# Patient Record
Sex: FEMALE | HISPANIC OR LATINO | Employment: FULL TIME | URBAN - METROPOLITAN AREA
[De-identification: names, ages, dates, MRNs, and addresses within clinical notes are randomized per-mention and may not be internally consistent; named-entity substitution may affect disease eponyms.]

---

## 2017-03-08 ENCOUNTER — HOSPITAL ENCOUNTER (EMERGENCY)
Facility: MEDICAL CENTER | Age: 37
End: 2017-03-08
Attending: EMERGENCY MEDICINE
Payer: MEDICAID

## 2017-03-08 ENCOUNTER — APPOINTMENT (OUTPATIENT)
Dept: RADIOLOGY | Facility: MEDICAL CENTER | Age: 37
End: 2017-03-08
Attending: EMERGENCY MEDICINE
Payer: MEDICAID

## 2017-03-08 VITALS
BODY MASS INDEX: 24.09 KG/M2 | WEIGHT: 141.09 LBS | SYSTOLIC BLOOD PRESSURE: 99 MMHG | OXYGEN SATURATION: 98 % | RESPIRATION RATE: 16 BRPM | TEMPERATURE: 98.1 F | DIASTOLIC BLOOD PRESSURE: 62 MMHG | HEIGHT: 64 IN | HEART RATE: 82 BPM

## 2017-03-08 DIAGNOSIS — S90.122A CONTUSION OF LESSER TOE OF LEFT FOOT WITHOUT DAMAGE TO NAIL, INITIAL ENCOUNTER: ICD-10-CM

## 2017-03-08 PROCEDURE — 73660 X-RAY EXAM OF TOE(S): CPT | Mod: LT

## 2017-03-08 PROCEDURE — 99283 EMERGENCY DEPT VISIT LOW MDM: CPT

## 2017-03-08 NOTE — ED AVS SNAPSHOT
3/8/2017          Sara Liz  [address] [place] [id number]    Dear Sara:    [facility] wants to ensure your discharge home is safe and you or your loved ones have had all your questions answered regarding your care after you leave the hospital.    You may receive a telephone call within two days of your discharge.  This call is to make certain you understand your discharge instructions as well as ensure we provided you with the best care possible during your stay with us.     The call will only last approximately 3-5 minutes and will be done by a nurse.    Once again, we want to ensure your discharge home is safe and that you have a clear understanding of any next steps in your care.  If you have any questions or concerns, please do not hesitate to contact us, we are here for you.  Thank you for choosing [facility] for your healthcare needs.    Sincerely,    Kel Stiles    [facility]

## 2017-03-08 NOTE — ED AVS SNAPSHOT
GuidesMob Access Code: [email or web address]  Expires: 4/7/2017  3:38 PM    Your email address is not on file at [facility].  Email Addresses are required for you to sign up for GuidesMob, please contact [phone] to verify your personal information and to provide your email address prior to attempting to register for GuidesMob.    Sara Liz  [address] [place] [address], [place] [address]    GuidesMob  A secure, online tool to manage your health information     [facility]’s GuidesMob® is a secure, online tool that connects you to your personalized health information from the privacy of your home -- day or night - making it very easy for you to manage your healthcare. Once the activation process is completed, you can even access your medical information using the GuidesMob jeovanny, which is available for free in the Apple Jeovanny store or Google Play store.     To learn more about GuidesMob, visit [email or web address]    There are two levels of access available (as shown below):   My Chart Features  [facility] Primary Care Doctor [facility]  Specialists [facility]  Urgent  Care Non-[facility] Primary Care Doctor   Email your healthcare team securely and privately 24/7 X X X    Manage appointments: schedule your next appointment; view details of past/upcoming appointments X      Request prescription refills. X      View recent personal medical records, including lab and immunizations X X X X   View health record, including health history, allergies, medications X X X X   Read reports about your outpatient visits, procedures, consult and ER notes X X X X   See your discharge summary, which is a recap of your hospital and/or ER visit that includes your diagnosis, lab results, and care plan X X  X     How to register for clypdt:  Once your e-mail address has been verified, follow the following steps to sign up for GuidesMob.     1. Go to  [email or web address]  2. Click on the Sign Up Now box, which takes you to the New Member Sign Up page.  You will need to provide the following information:  a. Enter your Ideal Network Access Code exactly as it appears at the top of this page. (You will not need to use this code after you’ve completed the sign-up process. If you do not sign up before the expiration date, you must request a new code.)   b. Enter your date of birth.   c. Enter your home email address.   d. Click Submit, and follow the next screen’s instructions.  3. Create a Neurologixt ID. This will be your Ideal Network login ID and cannot be changed, so think of one that is secure and easy to remember.  4. Create a Ideal Network password. You can change your password at any time.  5. Enter your Password Reset Question and Answer. This can be used at a later time if you forget your password.   6. Enter your e-mail address. This allows you to receive e-mail notifications when new information is available in Ideal Network.  7. Click Sign Up. You can now view your health information.    For assistance activating your Ideal Network account, call [phone]

## 2017-03-08 NOTE — ED AVS SNAPSHOT
Home Care Instructions                                                                                                                Sara Liz   MRN: [id number]    Department:  [facility], Emergency Dept   Date of Visit:  3/8/2017            [facility], Emergency Dept    [address] [place] [id number]    Phone:  [phone]      You were seen by     Reuben Carmona M.D.      Your Diagnosis Was     Contusion of lesser toe of left foot without damage to nail, initial encounter     S90.122A       Follow-up Information     1. Follow up with [facility], Emergency Dept.    Specialty:  Emergency Medicine    Why:  If symptoms worsen    Contact information    [address] [place] [id number] [phone]      Medication Information     Review all of your home medications and newly ordered medications with your primary doctor and/or pharmacist as soon as possible. Follow medication instructions as directed by your doctor and/or pharmacist.     Please keep your complete medication list with you and share with your physician. Update the information when medications are discontinued, doses are changed, or new medications (including over-the-counter products) are added; and carry medication information at all times in the event of emergency situations.               Medication List      ASK your doctor about these medications        Instructions    Morning Afternoon Evening Bedtime    Polyethylene Powd        by Does not apply route every day.                                Procedures and tests performed during your visit     DX-TOE(S) 2+ LEFT        Discharge Instructions       Contusion  A contusion is a deep bruise. Contusions happen when an injury causes bleeding under the skin. Signs of bruising include pain, puffiness (swelling), and discolored skin. The contusion may turn blue, purple, or yellow.  HOME CARE   · Put ice on the  injured area.  ¨ Put ice in a plastic bag.  ¨ Place a towel between your skin and the bag.  ¨ Leave the ice on for 15-20 minutes, 03-04 times a day.  · Only take medicine as told by your doctor.  · Rest the injured area.  · If possible, raise (elevate) the injured area to lessen puffiness.  GET HELP RIGHT AWAY IF:   · You have more bruising or puffiness.  · You have pain that is getting worse.  · Your puffiness or pain is not helped by medicine.  MAKE SURE YOU:   · Understand these instructions.  · Will watch your condition.  · Will get help right away if you are not doing well or get worse.     This information is not intended to replace advice given to you by your health care provider. Make sure you discuss any questions you have with your health care provider.     Document Released: 06/05/2009 Document Revised: 03/11/2013 Document Reviewed: 10/22/2012  Deezer Interactive Patient Education ©2016 [employer].            Patient Information     Patient Information    Following emergency treatment: all patient requiring follow-up care must return either to a private physician or a clinic if your condition worsens before you are able to obtain further medical attention, please return to the emergency room.     Billing Information    At [facility], we work to make the billing process streamlined for our patients.  Our Representatives are here to answer any questions you may have regarding your hospital bill.  If you have insurance coverage and have supplied your insurance information to us, we will submit a claim to your insurer on your behalf.  Should you have any questions regarding your bill, we can be reached online or by phone as follows:  Online: You are able pay your bills online or live chat with our representatives about any billing questions you may have. We are here to help Monday - Friday from 8:00am to 7:30pm and 9:00am - 12:00pm on Saturdays.  Please visit  [email or web address]  for more information.   Phone:  [phone] or [phone]    Please note that your emergency physician, surgeon, pathologist, radiologist, anesthesiologist, and other specialists are not employed by [facility] and will therefore bill separately for their services.  Please contact them directly for any questions concerning their bills at the numbers below:     Emergency Physician Services:  [phone]  [facility]:  [phone]  [facility]:  [phone]  [facility]:  [phone]    1. Your final bill may vary from the amount quoted upon discharge if all procedures are not complete at that time, or if your doctor has additional procedures of which we are not aware. You will receive an additional bill if you return to the Emergency Department at [facility] for suture removal regardless of the facility of which the sutures were placed.     2. Please arrange for settlement of this account at the emergency registration.    3. All self-pay accounts are due in full at the time of treatment.  If you are unable to meet this obligation then payment is expected within 4-5 days.     4. If you have had radiology studies (CT, X-ray, Ultrasound, MRI), you have received a preliminary result during your emergency department visit. Please contact the radiology department [phone] to receive a copy of your final result. Please discuss the Final result with your primary physician or with the follow up physician provided.     Crisis Hotline:  [place] Crisis Hotline:  0-584-IJVWOAE or 1-269.371.3038  [place] Crisis Hotline:    1-241.259.3207 or 596-976-4043         ED Discharge Follow Up Questions    1. In order to provide you with very good care, we would like to follow up with a phone call in the next few days.  May we have your permission to contact you?     YES /  NO    2. What is the best phone number to call  you? (       )_____-__________    3. What is the best time to call you?      Morning  /  Afternoon  /  Evening                   Patient Signature:  ____________________________________________________________    Date:  ____________________________________________________________

## 2017-03-08 NOTE — ED NOTES
4th and 5th toes taped together by ED tech, patient given discharge instructions and follow up information, ambulatory out of ED w/steady gait.

## 2017-03-08 NOTE — DISCHARGE INSTRUCTIONS
Contusion  A contusion is a deep bruise. Contusions happen when an injury causes bleeding under the skin. Signs of bruising include pain, puffiness (swelling), and discolored skin. The contusion may turn blue, purple, or yellow.  HOME CARE   · Put ice on the injured area.  ¨ Put ice in a plastic bag.  ¨ Place a towel between your skin and the bag.  ¨ Leave the ice on for 15-20 minutes, 03-04 times a day.  · Only take medicine as told by your doctor.  · Rest the injured area.  · If possible, raise (elevate) the injured area to lessen puffiness.  GET HELP RIGHT AWAY IF:   · You have more bruising or puffiness.  · You have pain that is getting worse.  · Your puffiness or pain is not helped by medicine.  MAKE SURE YOU:   · Understand these instructions.  · Will watch your condition.  · Will get help right away if you are not doing well or get worse.     This information is not intended to replace advice given to you by your health care provider. Make sure you discuss any questions you have with your health care provider.     Document Released: 06/05/2009 Document Revised: 03/11/2013 Document Reviewed: 10/22/2012  [employer] Interactive Patient Education ©2016 Elsevier Inc.

## 2017-03-08 NOTE — ED NOTES
Ambulatory to triage with   Chief Complaint   Patient presents with   • Toe Pain     left 2nd toe pain and bruising    Hit foot on dresser yesterday.

## 2017-03-08 NOTE — ED PROVIDER NOTES
ED Provider Note    Scribed for Reuben Carmona M.D. by Daren Freed. 3/8/2017  2:35 PM    Primary care provider: Rasta Araujo D.O.  Means of arrival: Walk in   History obtained from: Patient  History limited by: None    CHIEF COMPLAINT  Chief Complaint   Patient presents with   • Toe Pain     left 2nd toe pain and bruising        HPI  Sara Liz is a 36 y.o. female who presents to the Emergency Department complaining of toe pain. The patient reports she hit her foot on a dresser yesterday. She the developed left 4th toe pain and bruising that has increased since. Her children's physician advised her to come to the ED for possible fracture. She treated herself with Ibuprofen yesterday with relief. Currently, she has moderate pain to her left 4th toe exacerbated with range of motion. She denies any numbness/tingling. No fever. She has no other injuries. No pertinent past medical history.     REVIEW OF SYSTEMS  Pertinent positives include left 4th toe pain. Pertinent negatives include no fever, numbness, tingling.      PAST MEDICAL HISTORY   has a past medical history of Abnormal Pap smear of cervix (2009); Depression (9/2010); Urinary incontinence; Snoring; Hemorrhagic disorder ([facility]); Heart burn; Indigestion; and Bowel habit changes.    SURGICAL HISTORY   has past surgical history that includes other (2012); vaginal hysterectomy scope total (N/A, 1/19/2016); anterior and posterior repair (N/A, 1/19/2016); enterocele repair (N/A, 1/19/2016); vaginal suspension (N/A, 1/19/2016); and bladder sling female (N/A, 1/19/2016).    SOCIAL HISTORY  Social History   Substance Use Topics   • Smoking status: Never Smoker    • Alcohol Use: Yes      Comment: 0-1 beer per month      History   Drug Use No       FAMILY HISTORY  Family History   Problem Relation Age of Onset   • Hypertension Brother        CURRENT MEDICATIONS  No current facility-administered medications for this encounter.    Current  "outpatient prescriptions:   •  Polyethylene Powder, by Does not apply route every day., Disp: , Rfl:     ALLERGIES  Allergies   Allergen Reactions   • Food      Pork and shrimp not allergic does not eat due to Congregation beliefs   • Nkda [No Known Drug Allergy]        PHYSICAL EXAM  VITAL SIGNS: /67 mmHg  Pulse 94  Temp(Src) 36.7 °C (98.1 °F)  Resp 14  Ht 1.626 m (5' 4\")  Wt 64 kg (141 lb 1.5 oz)  BMI 24.21 kg/m2  SpO2 98%    Constitutional: Well developed, Well nourished, No distress, Non-toxic appearance.   HENT: Normocephalic, Atraumatic.  Oropharynx moist.   CV: Good pulses  Skin: Warm, Dry, No rash.    Musculoskeletal: Left 4th toe with ecchymosis and tenderness on the mid and distal phalanx. Decreased ROM due to pain.   Neurologic: Awake, alert. Moves all extremities spontaneously.  Psychiatric: Affect normal, Mood normal.      RADIOLOGY  DX-TOE(S) 2+ LEFT   Final Result      Negative left fourth toe series        The radiologist's interpretation of all radiological studies have been reviewed by me.    COURSE & MEDICAL DECISION MAKING  Nursing notes, VS, PMSFHx reviewed in chart.    2:35 PM - Patient seen and examined at bedside. Ordered Dx-toe left to evaluate her symptoms. Offered the patient pain medication and she declined. She has Ibuprofen at home.     3:35 PM - Reviewed patient's x-ray of toe results which showed negative. Patient will be given appropriate disharge instructions regarding contusion. She will be instructed to come back to the ED if symptoms worsen.      The patient is referred to a primary physician for blood pressure management, diabetic screening, and for all other preventative health concerns.    Decision Making:  Contusion of the toe x-rays negative radha tape the toes, have the patient return with any concerns.    DISPOSITION:  Patient will be discharged home in stable condition.    FOLLOW UP:  [facility], Emergency Dept  [address] [facility] " [id number]  [phone]    If symptoms worsen      FINAL IMPRESSION  1. Contusion of lesser toe of left foot without damage to nail, initial encounter          I, Daren Freed (Scribe), am scribing for, and in the presence of, Reuben Carmona M.D..    Electronically signed by: Daren Freed (Scribe), 3/8/2017    I, Reuben Carmona M.D. personally performed the services described in this documentation, as scribed by Daren Freed in my presence, and it is both accurate and complete.    The note accurately reflects work and decisions made by me.  Reuben Carmona  3/8/2017  9:08 PM

## 2017-09-22 ENCOUNTER — NON-PROVIDER VISIT (OUTPATIENT)
Dept: URGENT CARE | Facility: PHYSICIAN GROUP | Age: 37
End: 2017-09-22

## 2017-09-22 DIAGNOSIS — Z02.1 PRE-EMPLOYMENT DRUG SCREENING: ICD-10-CM

## 2017-09-22 LAB
AMP AMPHETAMINE: NORMAL
COC COCAINE: NORMAL
INT CON NEG: NEGATIVE
INT CON POS: POSITIVE
MET METHAMPHETAMINES: NORMAL
OPI OPIATES: NORMAL
PCP PHENCYCLIDINE: NORMAL
POC DRUG COMMENT 753798-OCCUPATIONAL HEALTH: NORMAL
THC: NORMAL

## 2017-09-22 PROCEDURE — 80305 DRUG TEST PRSMV DIR OPT OBS: CPT | Performed by: PHYSICIAN ASSISTANT

## 2019-09-05 ENCOUNTER — OFFICE VISIT (OUTPATIENT)
Dept: URGENT CARE | Facility: CLINIC | Age: 39
End: 2019-09-05
Payer: MEDICAID

## 2019-09-05 VITALS
HEART RATE: 82 BPM | DIASTOLIC BLOOD PRESSURE: 56 MMHG | HEIGHT: 64 IN | TEMPERATURE: 98 F | OXYGEN SATURATION: 98 % | WEIGHT: 137.6 LBS | SYSTOLIC BLOOD PRESSURE: 98 MMHG | BODY MASS INDEX: 23.49 KG/M2 | RESPIRATION RATE: 12 BRPM

## 2019-09-05 DIAGNOSIS — M25.50 POLYARTHRALGIA: ICD-10-CM

## 2019-09-05 PROCEDURE — 99203 OFFICE O/P NEW LOW 30 MIN: CPT | Performed by: PHYSICIAN ASSISTANT

## 2019-09-05 RX ORDER — METHYLPREDNISOLONE 4 MG/1
TABLET ORAL
Qty: 1 KIT | Refills: 0 | Status: SHIPPED | OUTPATIENT
Start: 2019-09-05

## 2019-09-05 NOTE — LETTER
September 5, 2019         Patient: Sara Pickard   YOB: 1980   Date of Visit: 9/5/2019           To Whom it May Concern:    Sara Enriquez was seen in my clinic on 9/5/2019. Please excuse her absence from 9/5/19-9/6/19.    If you have any questions or concerns, please don't hesitate to call.        Sincerely,           Jenna Suazo P.A.-C.  Electronically Signed

## 2019-09-06 ASSESSMENT — ENCOUNTER SYMPTOMS
SHORTNESS OF BREATH: 0
DIARRHEA: 0
MYALGIAS: 0
FALLS: 0
ABDOMINAL PAIN: 0
CHILLS: 0
FEVER: 0
DIZZINESS: 0
VOMITING: 0

## 2019-09-06 NOTE — PROGRESS NOTES
"Subjective:      Sara Pickard is a 39 y.o. female who presents with Ankle Swelling (x 4 days.  Pt. states that she has had bilateral ankle and hand swelling x 4 days.  She states that she she has had bilateral feet pain for 1 month.  ); Knee Pain (x 4 days.  She has had popping, swelling and pain in R knee for 4 days.); and Rash (x 3 days.  Pt. states that she had a rash all over her body with itchiness for 3 days.  She had a fever 2 nights prior to the rash.  It has now disappeared.  She has Scoliosis.)            HPI   Patient presents to urgent care reporting a 1 month history of pain of multiple joints, waxing and waning in severity. Joints mainly affected are the feet, ankles, knees, and hands. She noticed some swelling of the joints yesterday that have mainly resolved today along with the joint pain. She's been taking OTC ibuprofen with good relief of pain. No history of similar joint pains. No recent injuries. No family history of rheumatologic disorders. She has no known medical problems and is UTD on all routine vaccinations.     Review of Systems   Constitutional: Negative for chills and fever.   HENT: Negative for congestion.    Respiratory: Negative for shortness of breath.    Cardiovascular: Negative for chest pain.   Gastrointestinal: Negative for abdominal pain, diarrhea and vomiting.   Genitourinary: Negative.    Musculoskeletal: Positive for joint pain. Negative for falls and myalgias.   Skin: Negative for rash.   Neurological: Negative for dizziness.        Objective:     BP (!) 98/56   Pulse 82   Temp 36.7 °C (98 °F) (Temporal)   Resp 12   Ht 1.626 m (5' 4\")   Wt 62.4 kg (137 lb 9.6 oz)   LMP 12/29/2015 (Exact Date)   SpO2 98%   BMI 23.62 kg/m²      Physical Exam   Constitutional: She is oriented to person, place, and time. She appears well-developed and well-nourished. No distress.   HENT:   Head: Normocephalic and atraumatic.   Eyes: Pupils are equal, round, and reactive " to light.   Neck: Normal range of motion.   Cardiovascular: Normal rate.   Pulmonary/Chest: Effort normal.   Musculoskeletal: Normal range of motion.        Right knee: She exhibits normal range of motion, no swelling, no effusion and no ecchymosis. Tenderness found. Medial joint line and lateral joint line tenderness noted.        Right ankle: Normal. She exhibits normal range of motion, no swelling and no ecchymosis. No tenderness. No lateral malleolus and no medial malleolus tenderness found.        Left ankle: Normal. She exhibits normal range of motion, no swelling and no ecchymosis. No tenderness. No lateral malleolus and no medial malleolus tenderness found.        Right hand: She exhibits normal range of motion and no tenderness. Normal sensation noted. Decreased strength noted.        Left hand: She exhibits normal range of motion, no tenderness and no bony tenderness. Normal sensation noted. Decreased strength noted.    strength 4/5 bilaterally    Neurological: She is alert and oriented to person, place, and time.   Skin: Skin is warm and dry. She is not diaphoretic.   Psychiatric: She has a normal mood and affect. Her behavior is normal.   Nursing note and vitals reviewed.         PMH:  has a past medical history of Abnormal Pap smear of cervix (2009), Bowel habit changes, Depression (9/2010), Heart burn, Hemorrhagic disorder (HCC), Indigestion, Snoring, and Urinary incontinence.  MEDS:   Current Outpatient Medications:   •  methylPREDNISolone (MEDROL DOSEPAK) 4 MG Tablet Therapy Pack, Take as directed, Disp: 1 Kit, Rfl: 0  •  Polyethylene Powder, by Does not apply route every day., Disp: , Rfl:   ALLERGIES:   Allergies   Allergen Reactions   • Food      Pork and shrimp not allergic does not eat due to Spiritism beliefs   • Nkda [No Known Drug Allergy]      SURGHX:   Past Surgical History:   Procedure Laterality Date   • VAGINAL HYSTERECTOMY SCOPE TOTAL N/A 1/19/2016    Procedure: VAGINAL  HYSTERECTOMY SCOPE TOTAL WITH ARELI SALPINGECTOMY;  Surgeon: Danielito Hernandez M.D.;  Location: SURGERY SAME DAY [facility];  Service:    • ANTERIOR AND POSTERIOR REPAIR N/A 1/19/2016    Procedure: ANTERIOR AND POSTERIOR REPAIR;  Surgeon: Danielito Hernandez M.D.;  Location: SURGERY SAME DAY [facility];  Service:    • ENTEROCELE REPAIR N/A 1/19/2016    Procedure: ENTEROCELE REPAIR;  Surgeon: Danielito Hernandez M.D.;  Location: SURGERY SAME DAY [facility];  Service:    • VAGINAL SUSPENSION N/A 1/19/2016    Procedure: VAGINAL SUSPENSION SACROSPINOUS VAULT, PERINEOPLASTY;  Surgeon: Danielito Hernandez M.D.;  Location: SURGERY SAME DAY [facility];  Service:    • BLADDER SLING FEMALE N/A 1/19/2016    Procedure: BLADDER SLING FEMALE TOT;  Surgeon: Danielito Hernandez M.D.;  Location: SURGERY SAME DAY [facility];  Service:    • OTHER  2012    right breast cyst     SOCHX:  reports that she has never smoked. She has never used smokeless tobacco. She reports that she drinks alcohol. She reports that she does not use drugs.  FH: family history includes Hypertension in her brother.       Assessment/Plan:     1. Polyarthralgia  - methylPREDNISolone (MEDROL DOSEPAK) 4 MG Tablet Therapy Pack; Take as directed  Dispense: 1 Kit; Refill: 0    Encouraged icing 2-3 times daily along with OTC tylenol as needed for pain. Avoid OTC nsaids while taking oral steroids due to increased risk of GI bleed. Encouraged to follow up with primary care once she obtains medical insurance for further evaluation of polyarthralgia. The patient demonstrated a good understanding and agreed with the treatment plan.

## 2021-05-09 ENCOUNTER — APPOINTMENT (OUTPATIENT)
Dept: RADIOLOGY | Facility: IMAGING CENTER | Age: 41
End: 2021-05-09
Attending: NURSE PRACTITIONER
Payer: MEDICAID

## 2021-05-09 ENCOUNTER — OFFICE VISIT (OUTPATIENT)
Dept: URGENT CARE | Facility: CLINIC | Age: 41
End: 2021-05-09
Payer: MEDICAID

## 2021-05-09 VITALS
HEIGHT: 64 IN | DIASTOLIC BLOOD PRESSURE: 64 MMHG | WEIGHT: 138.6 LBS | SYSTOLIC BLOOD PRESSURE: 90 MMHG | OXYGEN SATURATION: 98 % | BODY MASS INDEX: 23.66 KG/M2 | TEMPERATURE: 98.4 F | RESPIRATION RATE: 16 BRPM | HEART RATE: 88 BPM

## 2021-05-09 DIAGNOSIS — S49.91XA INJURY OF RIGHT SHOULDER, INITIAL ENCOUNTER: ICD-10-CM

## 2021-05-09 PROCEDURE — 99213 OFFICE O/P EST LOW 20 MIN: CPT | Performed by: NURSE PRACTITIONER

## 2021-05-09 PROCEDURE — 73030 X-RAY EXAM OF SHOULDER: CPT | Mod: TC,RT | Performed by: NURSE PRACTITIONER

## 2021-05-09 NOTE — PROGRESS NOTES
Subjective:      Sara Pickard is a 40 y.o. female who presents with Arm Injury (right shoulder, fell off the treadmill today,)    Past Medical History:   Diagnosis Date   • Abnormal Pap smear of cervix 2009   • Bowel habit changes     constipation   • Depression 9/2010   • Heart burn     occas   • Hemorrhagic disorder (HCC)     nose bleeds and gums   • Indigestion    • Snoring    • Urinary incontinence      Social History     Socioeconomic History   • Marital status:      Spouse name: Not on file   • Number of children: Not on file   • Years of education: Not on file   • Highest education level: Not on file   Occupational History   • Not on file   Tobacco Use   • Smoking status: Never Smoker   • Smokeless tobacco: Never Used   Substance and Sexual Activity   • Alcohol use: Yes     Comment: 0-1 beer per month   • Drug use: No   • Sexual activity: Yes     Partners: Male   Other Topics Concern   • Not on file   Social History Narrative   • Not on file     Social Determinants of Health     Financial Resource Strain:    • Difficulty of Paying Living Expenses:    Food Insecurity:    • Worried About Running Out of Food in the Last Year:    • Ran Out of Food in the Last Year:    Transportation Needs:    • Lack of Transportation (Medical):    • Lack of Transportation (Non-Medical):    Physical Activity:    • Days of Exercise per Week:    • Minutes of Exercise per Session:    Stress:    • Feeling of Stress :    Social Connections:    • Frequency of Communication with Friends and Family:    • Frequency of Social Gatherings with Friends and Family:    • Attends [employer] Services:    • Active Member of Clubs or Organizations:    • Attends Club or Organization Meetings:    • Marital Status:    Intimate Partner Violence:    • Fear of Current or Ex-Partner:    • Emotionally Abused:    • Physically Abused:    • Sexually Abused:      Family History   Problem Relation Age of Onset   • Hypertension Brother   "      Allergies: Food and Nkda [no known drug allergy]    Patient is a 40-year-old female who presents today with complaint of acute pain and injury with decreased range of motion in the right shoulder.  She also has superficial abrasions over the anterior aspect of both knees.  Patient states she fell on her treadmill about 6:00 this morning.  States she was running and slipped and flew off the end of the treadmill.  She is not sure of the position when she fell.  But states she has several superficial cuts and scratches on her legs.  Her main concern is the pain she is having in her right shoulder along with decreased range of motion.  She states no head injury or loss of consciousness.  She states her knees are tender, and she attributes this to abrasions as she states she has been able to walk without pain.            Arm Injury  This is a new problem. The current episode started today. The problem occurs constantly. The problem has been unchanged. Associated symptoms comments: Arm injury. Nothing aggravates the symptoms. She has tried nothing for the symptoms. The treatment provided no relief.       Review of Systems   Musculoskeletal:        Right shoulder pain   All other systems reviewed and are negative.         Objective:     BP (!) 90/64 (BP Location: Left arm, Patient Position: Sitting, BP Cuff Size: Adult)   Pulse 88   Temp 36.9 °C (98.4 °F) (Temporal)   Resp 16   Ht 1.626 m (5' 4\")   Wt 62.9 kg (138 lb 9.6 oz)   LMP 12/29/2015 (Exact Date)   SpO2 98%   BMI 23.79 kg/m²      Physical Exam  Vitals reviewed.   Constitutional:       Appearance: Normal appearance.   Neck:        Comments: No point tenderness over the C-spine.  Mild point tenderness over the right side of the neck  Musculoskeletal:        Arms:       Cervical back: Normal range of motion and neck supple. Tenderness present. No rigidity.      Comments: Point tenderness over the right shoulder, both anteriorly and posteriorly.  Patient " is unable to abduct anteriorly or laterally.  Limited range of motion with posterior abduction.  No obvious deformity noted.  No point tenderness over the clavicle.  5/5 and equal in the upper extremities.    Skin:     General: Skin is warm and dry.      Capillary Refill: Capillary refill takes less than 2 seconds.   Neurological:      General: No focal deficit present.      Mental Status: She is alert.   Psychiatric:         Mood and Affect: Mood normal.         Behavior: Behavior normal.         Thought Content: Thought content normal.         Judgment: Judgment normal.          XR shoulder:     5/9/2021 2:46 PM     HISTORY/REASON FOR EXAM:  Pain/Deformity Following Trauma        TECHNIQUE/EXAM DESCRIPTION AND NUMBER OF VIEWS:  3 views of the RIGHT shoulder.     COMPARISON: None     FINDINGS:  There is lucency in the greater tuberosity, likely representing a nondisplaced fracture. No additional abnormalities are identified. No dislocation. No displaced rib fractures are identified.     IMPRESSION:     Lucency in the greater tuberosity likely represents a nondisplaced fracture.       Assessment/Plan:   Right shoulder injury  Nondisplaced fracture of the right proximal humerus in the greater tuberosity    Shoulder immobilizer placed  Referral given to orthopedics  Patient will see [facility] tomorrow; patient was given address and phone number  Ice  Ibuprofen  Discussed possible prescription for Tylenol 3, or Norco; patient would like to try ibuprofen first and will notify me if she is not obtaining enough pain relief.  Strict ER precautions for increasing pain, numbness, tingling, or weakness of the right upper extremity     There are no diagnoses linked to this encounter.